# Patient Record
Sex: FEMALE | Race: WHITE | NOT HISPANIC OR LATINO | Employment: OTHER | URBAN - METROPOLITAN AREA
[De-identification: names, ages, dates, MRNs, and addresses within clinical notes are randomized per-mention and may not be internally consistent; named-entity substitution may affect disease eponyms.]

---

## 2022-07-14 ENCOUNTER — TELEPHONE (OUTPATIENT)
Dept: NEUROLOGY | Facility: OTHER | Age: 67
End: 2022-07-14

## 2022-07-14 NOTE — TELEPHONE ENCOUNTER
Caller: JEET VALLE    Relationship to patient: Emergency Contact; SPOUSE    Best call back number: [phone]    Chief complaint: PT'S  CALLED REQUESTING TO SCHEDULE PT FOR A NEW PATIENT APPT W/ NEUROLOGIST AS HE FEARS SHE MAY HAVE HAD A STROKE IN THE LAST MONTH. I ASKED IF SHE HAS BEEN SEEN BY ED OR AT LEAST, PCP. PT'S  STATED NO, PT HAS NOT BEEN EVALUATED FOR POSSIBLE STROKE.    I ADVISED OUR OFFICES REQUIRE A REFERRAL PRIOR TO SCHEDULING AND DUE TO THE URGENCY OF POSSIBLE STROKE, RECOMMEND HE TAKE HER TO THE NEAREST ED FOR FURTHER EVAL. I ALSO EXPLAINED OUR OFFICES ARE BOOKING SEVERAL MONTHS OUT FOR NEW PATIENT APPTS AND UNABLE TO PREFORM TESTS, IN OFFICE,NECESSARY TO CONFIRM IF SHE HAS HAD A STROKE. PT'S  VERBALIZED UNDERSTANDING.    Patient directed to call 911 or go to their nearest emergency room.     Patient verbalized understanding: [x] Yes  [] No    PLEASE REVIEW AND ADVISE.